# Patient Record
Sex: FEMALE | Race: WHITE | URBAN - METROPOLITAN AREA
[De-identification: names, ages, dates, MRNs, and addresses within clinical notes are randomized per-mention and may not be internally consistent; named-entity substitution may affect disease eponyms.]

---

## 2017-08-24 ENCOUNTER — APPOINTMENT (OUTPATIENT)
Dept: GENERAL RADIOLOGY | Facility: CLINIC | Age: 72
End: 2017-08-24
Attending: EMERGENCY MEDICINE
Payer: MEDICARE

## 2017-08-24 ENCOUNTER — APPOINTMENT (OUTPATIENT)
Dept: MRI IMAGING | Facility: CLINIC | Age: 72
End: 2017-08-24
Attending: EMERGENCY MEDICINE
Payer: MEDICARE

## 2017-08-24 ENCOUNTER — HOSPITAL ENCOUNTER (OUTPATIENT)
Facility: CLINIC | Age: 72
Setting detail: OBSERVATION
Discharge: HOME | End: 2017-08-25
Attending: EMERGENCY MEDICINE | Admitting: INTERNAL MEDICINE
Payer: MEDICARE

## 2017-08-24 DIAGNOSIS — H53.2 DIPLOPIA: ICD-10-CM

## 2017-08-24 DIAGNOSIS — G45.9 TRANSIENT CEREBRAL ISCHEMIA, UNSPECIFIED TYPE: Primary | ICD-10-CM

## 2017-08-24 LAB
ANION GAP SERPL CALCULATED.3IONS-SCNC: 7 MMOL/L (ref 3–14)
BASOPHILS # BLD AUTO: 0.1 10E9/L (ref 0–0.2)
BASOPHILS NFR BLD AUTO: 0.6 %
BUN SERPL-MCNC: 11 MG/DL (ref 7–30)
CALCIUM SERPL-MCNC: 9.2 MG/DL (ref 8.5–10.1)
CHLORIDE SERPL-SCNC: 104 MMOL/L (ref 94–109)
CO2 SERPL-SCNC: 31 MMOL/L (ref 20–32)
CREAT SERPL-MCNC: 0.84 MG/DL (ref 0.52–1.04)
DIFFERENTIAL METHOD BLD: ABNORMAL
EOSINOPHIL # BLD AUTO: 0.4 10E9/L (ref 0–0.7)
EOSINOPHIL NFR BLD AUTO: 4 %
ERYTHROCYTE [DISTWIDTH] IN BLOOD BY AUTOMATED COUNT: 13.5 % (ref 10–15)
GFR SERPL CREATININE-BSD FRML MDRD: 66 ML/MIN/1.7M2
GLUCOSE SERPL-MCNC: 86 MG/DL (ref 70–99)
HCT VFR BLD AUTO: 46.2 % (ref 35–47)
HGB BLD-MCNC: 15.4 G/DL (ref 11.7–15.7)
IMM GRANULOCYTES # BLD: 0 10E9/L (ref 0–0.4)
IMM GRANULOCYTES NFR BLD: 0.3 %
LYMPHOCYTES # BLD AUTO: 2.5 10E9/L (ref 0.8–5.3)
LYMPHOCYTES NFR BLD AUTO: 25.7 %
MCH RBC QN AUTO: 30.2 PG (ref 26.5–33)
MCHC RBC AUTO-ENTMCNC: 33.3 G/DL (ref 31.5–36.5)
MCV RBC AUTO: 91 FL (ref 78–100)
MONOCYTES # BLD AUTO: 0.7 10E9/L (ref 0–1.3)
MONOCYTES NFR BLD AUTO: 6.7 %
NEUTROPHILS # BLD AUTO: 6.2 10E9/L (ref 1.6–8.3)
NEUTROPHILS NFR BLD AUTO: 62.7 %
NRBC # BLD AUTO: 0 10*3/UL
NRBC BLD AUTO-RTO: 0 /100
PLATELET # BLD AUTO: 599 10E9/L (ref 150–450)
POTASSIUM SERPL-SCNC: 3.4 MMOL/L (ref 3.4–5.3)
RBC # BLD AUTO: 5.1 10E12/L (ref 3.8–5.2)
SODIUM SERPL-SCNC: 142 MMOL/L (ref 133–144)
TROPONIN I SERPL-MCNC: 0.06 UG/L (ref 0–0.04)
WBC # BLD AUTO: 9.8 10E9/L (ref 4–11)

## 2017-08-24 PROCEDURE — 96360 HYDRATION IV INFUSION INIT: CPT

## 2017-08-24 PROCEDURE — 71020 XR CHEST 2 VW: CPT

## 2017-08-24 PROCEDURE — 85025 COMPLETE CBC W/AUTO DIFF WBC: CPT | Performed by: EMERGENCY MEDICINE

## 2017-08-24 PROCEDURE — A9585 GADOBUTROL INJECTION: HCPCS | Performed by: RADIOLOGY

## 2017-08-24 PROCEDURE — 80048 BASIC METABOLIC PNL TOTAL CA: CPT | Performed by: EMERGENCY MEDICINE

## 2017-08-24 PROCEDURE — 99285 EMERGENCY DEPT VISIT HI MDM: CPT | Mod: 25

## 2017-08-24 PROCEDURE — G0378 HOSPITAL OBSERVATION PER HR: HCPCS

## 2017-08-24 PROCEDURE — 93005 ELECTROCARDIOGRAM TRACING: CPT

## 2017-08-24 PROCEDURE — 70549 MR ANGIOGRAPH NECK W/O&W/DYE: CPT

## 2017-08-24 PROCEDURE — 25000132 ZZH RX MED GY IP 250 OP 250 PS 637: Performed by: EMERGENCY MEDICINE

## 2017-08-24 PROCEDURE — 70553 MRI BRAIN STEM W/O & W/DYE: CPT

## 2017-08-24 PROCEDURE — 99207 ZZC CDG-MDM COMPONENT: MEETS MODERATE - UP CODED: CPT | Performed by: PHYSICIAN ASSISTANT

## 2017-08-24 PROCEDURE — 25000128 H RX IP 250 OP 636: Performed by: RADIOLOGY

## 2017-08-24 PROCEDURE — 96361 HYDRATE IV INFUSION ADD-ON: CPT

## 2017-08-24 PROCEDURE — 25000128 H RX IP 250 OP 636: Performed by: EMERGENCY MEDICINE

## 2017-08-24 PROCEDURE — 84484 ASSAY OF TROPONIN QUANT: CPT | Performed by: EMERGENCY MEDICINE

## 2017-08-24 PROCEDURE — 99220 ZZC INITIAL OBSERVATION CARE,LEVL III: CPT | Performed by: PHYSICIAN ASSISTANT

## 2017-08-24 RX ORDER — ONDANSETRON 4 MG/1
4 TABLET, ORALLY DISINTEGRATING ORAL EVERY 6 HOURS PRN
Status: DISCONTINUED | OUTPATIENT
Start: 2017-08-24 | End: 2017-08-25 | Stop reason: HOSPADM

## 2017-08-24 RX ORDER — PROCHLORPERAZINE 25 MG
12.5 SUPPOSITORY, RECTAL RECTAL EVERY 12 HOURS PRN
Status: DISCONTINUED | OUTPATIENT
Start: 2017-08-24 | End: 2017-08-25 | Stop reason: HOSPADM

## 2017-08-24 RX ORDER — SODIUM CHLORIDE 9 MG/ML
1000 INJECTION, SOLUTION INTRAVENOUS CONTINUOUS
Status: DISCONTINUED | OUTPATIENT
Start: 2017-08-24 | End: 2017-08-24

## 2017-08-24 RX ORDER — ACETAMINOPHEN 500 MG
500-1000 TABLET ORAL EVERY 6 HOURS PRN
Status: DISCONTINUED | OUTPATIENT
Start: 2017-08-24 | End: 2017-08-25 | Stop reason: HOSPADM

## 2017-08-24 RX ORDER — AMOXICILLIN 250 MG
1-2 CAPSULE ORAL 2 TIMES DAILY PRN
Status: DISCONTINUED | OUTPATIENT
Start: 2017-08-24 | End: 2017-08-25 | Stop reason: HOSPADM

## 2017-08-24 RX ORDER — GADOBUTROL 604.72 MG/ML
10 INJECTION INTRAVENOUS ONCE
Status: COMPLETED | OUTPATIENT
Start: 2017-08-24 | End: 2017-08-24

## 2017-08-24 RX ORDER — IBUPROFEN 600 MG/1
600 TABLET, FILM COATED ORAL EVERY 6 HOURS PRN
Status: DISCONTINUED | OUTPATIENT
Start: 2017-08-24 | End: 2017-08-25 | Stop reason: HOSPADM

## 2017-08-24 RX ORDER — ASPIRIN 325 MG
325 TABLET ORAL ONCE
Status: COMPLETED | OUTPATIENT
Start: 2017-08-24 | End: 2017-08-24

## 2017-08-24 RX ORDER — ONDANSETRON 2 MG/ML
4 INJECTION INTRAMUSCULAR; INTRAVENOUS EVERY 6 HOURS PRN
Status: DISCONTINUED | OUTPATIENT
Start: 2017-08-24 | End: 2017-08-25 | Stop reason: HOSPADM

## 2017-08-24 RX ORDER — ASPIRIN 81 MG/1
81 TABLET ORAL DAILY
Status: DISCONTINUED | OUTPATIENT
Start: 2017-08-25 | End: 2017-08-25 | Stop reason: HOSPADM

## 2017-08-24 RX ORDER — NALOXONE HYDROCHLORIDE 0.4 MG/ML
.1-.4 INJECTION, SOLUTION INTRAMUSCULAR; INTRAVENOUS; SUBCUTANEOUS
Status: DISCONTINUED | OUTPATIENT
Start: 2017-08-24 | End: 2017-08-25 | Stop reason: HOSPADM

## 2017-08-24 RX ORDER — PROCHLORPERAZINE MALEATE 5 MG
5 TABLET ORAL EVERY 6 HOURS PRN
Status: DISCONTINUED | OUTPATIENT
Start: 2017-08-24 | End: 2017-08-25 | Stop reason: HOSPADM

## 2017-08-24 RX ORDER — HYDRALAZINE HYDROCHLORIDE 20 MG/ML
10 INJECTION INTRAMUSCULAR; INTRAVENOUS EVERY 4 HOURS PRN
Status: DISCONTINUED | OUTPATIENT
Start: 2017-08-24 | End: 2017-08-25 | Stop reason: HOSPADM

## 2017-08-24 RX ADMIN — ASPIRIN 325 MG ORAL TABLET 325 MG: 325 PILL ORAL at 18:41

## 2017-08-24 RX ADMIN — GADOBUTROL 10 ML: 604.72 INJECTION INTRAVENOUS at 18:36

## 2017-08-24 RX ADMIN — SODIUM CHLORIDE 1000 ML: 9 INJECTION, SOLUTION INTRAVENOUS at 17:30

## 2017-08-24 ASSESSMENT — VISUAL ACUITY
OS: 20/25
OD: 20/25

## 2017-08-24 ASSESSMENT — ENCOUNTER SYMPTOMS
ABDOMINAL PAIN: 0
HEMATURIA: 0
SHORTNESS OF BREATH: 0
FREQUENCY: 0
SPEECH DIFFICULTY: 0
DIARRHEA: 1
DIFFICULTY URINATING: 0
NUMBNESS: 0
HEADACHES: 1
DIZZINESS: 1
PHOTOPHOBIA: 1
WEAKNESS: 0
DYSURIA: 0

## 2017-08-24 ASSESSMENT — PAIN DESCRIPTION - DESCRIPTORS: DESCRIPTORS: HEADACHE

## 2017-08-24 NOTE — ED NOTES
"Patient presents complaining of headache and \"a spell\" of double vision that lasted about 5 minutes this afternoon. She denies other complaints at this time. She is alert and oriented, ABCs intact.  "

## 2017-08-24 NOTE — IP AVS SNAPSHOT
MRN:[id number]                      After Visit Summary   8/24/2017    Ally Hernandez    MRN: [id number]           Thank you!     Thank you for choosing [facility] for your care. Our goal is always to provide you with excellent care. Hearing back from our patients is one way we can continue to improve our services. Please take a few minutes to complete the written survey that you may receive in the mail after you visit. If you would like to speak to someone directly about your visit please contact Patient Relations at [phone]. Thank you!          Patient Information     Date Of Birth          1945        About your hospital stay     You were admitted on:  August 24, 2017 You last received care in the:  [facility] Observation Department    You were discharged on:  August 25, 2017        Reason for your hospital stay       You were evaluated in the hospital for a several-minute episode of diplopia (double vision) and increased headache. Your vision changes resolved before you got to the ER. You were evaluated extensively in the ER and had a normal MRI of the brain and neck vessels. A lab called troponin was checked and found to be mildly elevated. Your heart rhythm was monitored overnight, and we checked additional troponin values throughout the night to trend them. Your troponin values were rather stable, slowly trended up a little and then back down. We discussed that elevated troponin lab can be from multiple causes (anything that would put strain on the heart like persistently high blood pressure, rapid heart rhythm, or in some cases a heart attack). You heart rhythm was a normal rate and regular rhythm all night. You had no chest pain or shortness of breath. You did have very high blood pressure when you came to the emergency department (now resolved) and on a previous cardiac monitor you had several small episodes of rapid heart rhythms), so that may be why  the troponin values were mildly elevated. An echocardiogram was performed that was normal with no evidence of patent foramen ovale (opening between the top two chambers of the heart). It is unclear what caused your symptoms. It may be related to post-concussive syndrome, it may have been a transient ischemic attack (stroke-like symptoms that resolve in less than 24 hours), or it may have been related to an atypical migraine headache. For now, we will have you restart on aspirin 81 mg daily (along with zantac 150 mg twice a day to protect your stomach lining).  We will have you wear a zio patch for the next two weeks. Continue concussion protocol as instructed by your primary providers. We will have you follow up with your primary care provider within the next week.                  Who to Call     For medical emergencies, please call 911.  For non-urgent questions about your medical care, please call your primary care provider or clinic, [phone]          Attending Provider     Provider Specialty    Bruna Hubbard MD Emergency Medicine    Erick Mendez MD Emergency Medicine    Michelle Turner DO Internal Medicine       Primary Care Provider Office Phone # Fax #    Loida Cox [phone]       When to contact your care team       Call your primary doctor if you have any of the following: temperature greater than 101, increased shortness of breath or increased pain.  Return to the ER if you have chest pain, shortness of breath, or anything concerning for stroke (vision loss or sudden change, facial droop, difficulty with speech, weakness or numbness on one side of the body).                  After Care Instructions     Activity       Your activity upon discharge: activity as tolerated            Diet       Follow this diet upon discharge: Orders Placed This Encounter      Regular Diet Adult                  Follow-up Appointments     Follow-up and recommended labs and  "tests        Follow up with primary care provider, Loida Cox, within 7 days for hospital follow- up.  No follow up labs or test are needed at this time, follow up with Loida Cox regarding your Zio Patch results.                             Pending Results     No orders found for last 3 day(s).            Statement of Approval     Ordered          17 1237  I have reviewed and agree with all the recommendations and orders detailed in this document.  EFFECTIVE NOW     Approved and electronically signed by:  Lashae Conrad PA-C             Admission Information     Date & Time Provider Department Dept. Phone    2017 Michelle Turner DO [facility] Observation Department [phone]      Your Vitals Were     Blood Pressure Pulse Temperature Respirations Height Weight    121/72 (BP Location: Left arm) 68 97.5  F (36.4  C) (Oral) 18 1.702 m (5' 7\") 62.5 kg (137 lb 12.8 oz)    Pulse Oximetry BMI (Body Mass Index)                96% 21.58 kg/m2          [employer] Information     [employer] lets you send messages to your doctor, view your test results, renew your prescriptions, schedule appointments and more. To sign up, go to [email or web address] . Click on \"Log in\" on the left side of the screen, which will take you to the Welcome page. Then click on \"Sign up Now\" on the right side of the page.     You will be asked to enter the access code listed below, as well as some personal information. Please follow the directions to create your username and password.     Your access code is: [email or web address]  Expires: 2017 12:38 PM     Your access code will  in 90 days. If you need help or a new code, please call your [place] clinic or [phone].        Care EveryWhere ID     This is your Care EveryWhere ID. This could be used by other organizations to access your [place] medical records  [id number]        Equal Access to Services     VENKAT AMES AH: Cristiane ambrosio " Leoncio, wadominickda luqadaha, qaybta kaalmada carlos, michi livingstonnancy bobby. [facility] [phone].    ATENCIÓN: Si nelly boyle, tiene a martinez disposición servicios gratuitos de asistencia lingüística. Tian al [phone].    We comply with applicable federal civil rights laws and [place] laws. We do not discriminate on the basis of race, color, national origin, age, disability sex, sexual orientation or gender identity.               Review of your medicines      START taking        Dose / Directions    aspirin 81 MG EC tablet   Used for:  Transient cerebral ischemia, unspecified type        Dose:  81 mg   Take 1 tablet (81 mg) by mouth daily   Refills:  0       ranitidine 75 MG tablet   Commonly known as:  ZANTAC   Used for:  Transient cerebral ischemia, unspecified type        Dose:  75 mg   Take 1 tablet (75 mg) by mouth 2 times daily   Quantity:  30 tablet   Refills:  0         CONTINUE these medicines which have NOT CHANGED        Dose / Directions    ATORVASTATIN CALCIUM PO        Dose:  40 mg   Take 40 mg by mouth At Bedtime   Refills:  0       LISINOPRIL PO        Dose:  10 mg   Take 10 mg by mouth daily   Refills:  0            Where to get your medicines      Some of these will need a paper prescription and others can be bought over the counter. Ask your nurse if you have questions.     You don't need a prescription for these medications     aspirin 81 MG EC tablet    ranitidine 75 MG tablet                Protect others around you: Learn how to safely use, store and throw away your medicines at www.disposemymeds.org.             Medication List: This is a list of all your medications and when to take them. Check marks below indicate your daily home schedule. Keep this list as a reference.      Medications           Morning Afternoon Evening Bedtime As Needed    aspirin 81 MG EC tablet   Take 1 tablet (81 mg) by mouth daily   Last time this was given:  81 mg on 8/25/2017  8:54 AM                                 ATORVASTATIN CALCIUM PO   Take 40 mg by mouth At Bedtime   Last time this was given:  40 mg on 8/25/2017  4:09 AM                                LISINOPRIL PO   Take 10 mg by mouth daily   Last time this was given:  10 mg on 8/25/2017  8:54 AM                                ranitidine 75 MG tablet   Commonly known as:  ZANTAC   Take 1 tablet (75 mg) by mouth 2 times daily

## 2017-08-24 NOTE — IP AVS SNAPSHOT
[facility] Observation Department    [address]    [facility] [address]    Phone:  [phone]                                       After Visit Summary   8/24/2017    Ally Hernandez    MRN: [id number]           After Visit Summary Signature Page     I have received my discharge instructions, and my questions have been answered. I have discussed any challenges I see with this plan with the nurse or doctor.    ..........................................................................................................................................  Patient/Patient Representative Signature      ..........................................................................................................................................  Patient Representative Print Name and Relationship to Patient    ..................................................               ................................................  Date                                            Time    ..........................................................................................................................................  Reviewed by Signature/Title    ...................................................              ..............................................  Date                                                            Time

## 2017-08-24 NOTE — ED PROVIDER NOTES
"  History     Chief Complaint:  Double vision, Headache    HPI   Ally Hernandez is a 71 year old female who presents with double vision and a headache. About six weeks ago, she fell and hit the back of her head and was diagnosed with a concussion. Since then, she has been going to physical therapy and a chiropractor to help with recovery. She notes intermittent headaches since the concussion. Tuesday evening, two days ago, she was preparing dinner when she experienced a bout of dizziness. She had to sit down for \"quite a while,\" which seemed to help. Today at 2:00 PM, she sat down at the computer to check something when she experienced double vision and worsening of her headache. She estimated it lasted about five minutes, and subsided after she kept her eyes closed. This has not happened before, but she became nervous about the double vision since it was on a list of things to watch for with her concussion. She reported that the headache felt more severe after the computer incident, but currently feels similar to the headaches she has experienced since the concussion. She notes mild photophobia with the headache. The patient has no personal history of strokes or TIA, but her mother has a history of TIA. She has no personal history of heart attacks, but she does have moderate hypertension, which she takes pills for, and had a pericardiectomy in 1987. She denies dyspnea, chest pain, abdominal pain, fever, vomiting, or urinary symptoms. No numbness, weakness, or speech changes. The patient notes she has had diarrhea today, but this is not abnormal for her.      Allergies:  Indocin     Medications:    Lisinopril    Atorvastatin       Past Medical History:    Hypertension  Concussion     Past Surgical History:    Pericardiectomy    Family History:    TIA    Social History:  Smoking status: No  Alcohol use: No  Marital Status:   [2]  Presents to ED with daughters     Review of Systems   Eyes: Positive for " photophobia and visual disturbance.   Respiratory: Negative for shortness of breath.    Cardiovascular: Negative for chest pain.   Gastrointestinal: Positive for diarrhea. Negative for abdominal pain.   Genitourinary: Negative for difficulty urinating, dysuria, frequency and hematuria.   Musculoskeletal: Negative for gait problem.   Neurological: Positive for dizziness and headaches. Negative for speech difficulty, weakness and numbness.   All other systems reviewed and are negative.      Physical Exam   Patient Vitals for the past 24 hrs:   BP Temp Temp src Pulse Heart Rate Resp SpO2   08/24/17 1905 (!) 162/99 - - - 77 17 98 %   08/24/17 1850 143/85 - - - 72 23 98 %   08/24/17 1715 (!) 163/99 - - - - - -   08/24/17 1640 156/90 - - - 71 - 99 %   08/24/17 1635 (!) 175/93 - - - 88 - 99 %   08/24/17 1605 (!) 186/109 97.5  F (36.4  C) Oral 82 - 20 95 %     Physical Exam  General: Cooperative, alert. Appears in mild discomfort.  Head:  The scalp, face, and head appear normal  Eyes:  The pupils are equal, round, and reactive to light    There is no nystagmus    Extraocular muscles are intact. Unable to replicate diplopia with extraocular movements.    Conjunctivae and sclerae are normal  ENT:    The nose is normal    Pinnae are normal    The oropharynx is normal    Uvula is in the midline  Neck:  Normal range of motion  CV:  Regular rate and underlying rhythm     There is no evidence of Atrial Fibrillation    Normal S1 and S2    No S3 or S4    No pathological murmur detected  Resp:  Lungs are clear    There is no tachypnea    Non-labored breathing    No rales    No wheezing   GI:  Abdomen is soft, there is no rigidity    No distension    No tympani    No rebound tenderness     Non-surgical without peritoneal features  MS:  No major joint effusions    No asymmetric leg swelling    No calf tenderness  Skin:  No rash or acute skin lesions noted  Neuro: NIHSS:    LOC:  Alert      Answers questions correctly      Obeys  commands correctly    Gaze:  Normal. No palsy or forced deviation    Visual:  No visual loss, no hemianopsia    Facial:  Normal.  No palsy    Motor:  No drift, weakness, all four extremities tested    Ataxia:  No limb ataxia    Sensory: Normal    Speech: No aphasia      No dysarthria    Inattention: No neglect    Total:  0  Psych:  Awake. Alert.  Normal affect.  Appropriate interactions.  Lymph: No anterior or posterior cervical lymphadenopathy noted      Emergency Department Course   ECG:  ECG taken at 1619, ECG read at 2015  Normal sinus rhythm   Possible left atrial enlargement   Abnormal ECG  Rate 78 bpm. ME interval 148. QRS duration 74. QT/QTc 374/426. P-R-T axes 69 -27 33.    Imaging:  Radiology findings were communicated with the patient who voiced understanding of the findings.    MRA Angiogram Neck w/o and w contrast  Normal MR angiogram of the neck.  As read by Radiology.    MR Brain w/o and w contrast  Diffuse cerebral volume loss and cerebral white matter  changes consistent with chronic small vessel ischemic disease. No  evidence for acute intracranial pathology.   As read by Radiology.    X-ray Chest, 2 views:  Clear lungs  Result per radiology.     Laboratory:  Troponin: 0.061 (H)  CBC: (H), o/w  WNL (WBC 9.8, HGB 15.4)   BMP: WNL (Creatinine 0.84)    Interventions:  1730: NS 1L IV Bolus  1841: Aspirin 325 mg oral     Emergency Department Course:  Past medical records, nursing notes, and vitals reviewed.  1710: I performed an exam of the patient and obtained history, as documented above.  IV inserted and blood drawn. The patient was placed on continuous cardiac monitoring and pulse oximetry.  ECG obtained, results above  The patient was sent for a chest x-ray while in the emergency department, findings above.  The patient was sent for a MRI/MRA head neck while in the emergency department, findings above.    ABCD2 Score for TIA (calculator)  Background  Calculates overall risk of future TIA  based on 5 factors: Age>=60, SBP>140/90, weakness, impaired speech, duration, diabetes.  Data  71 year old   does not have a problem list on file.  Criteria   Of possible 7 points for 6 possible items  1 point: Age >=60  1 point: Systolic Blood pressure>=140/90  Interpretation  ABCD Score: 2  Points 0-3: Risk of CVA 1.0% within the subsequent 2 days of TIA    1903: I rechecked the patient. Explained findings to the patient and daughters.    1941: I spoke to PAULINE Saini of the hospitalist service who accepts the patient for admission.     Findings and plan explained to the Patient who consents to admission.   Discussed the patient with PAULINE Saini, who will admit the patient to an obs bed for further monitoring, evaluation, and treatment.     Impression & Plan      Medical Decision Making:  Patient is a 71 year old female with a history of hypertension and concussion who presents with a brief episode of diplopia lasting for about 5 minutes early this afternoon. The patient may have had a TIA, which was our largest concern on her initial evaluation. She has a normal neuro exam here with no focal deficits elicited by my exam. Her blood pressure remains in the 160s systolic while in the emergency department. She has no return of her symptoms while in the emergency department. MRI was negative. On the rest of her work-up, her labs are remarkable for an elevated troponin to 0.061. Her EKG is non-ischemic and the patient did not have any evidence of chest pain or anginal symptoms. Due to elevated troponin and likely TIA versus atypical migraine experience today, the patient will be admitted for observation for serial troponins, and further evaluation and risk stratification for TIA.      Diagnosis:  1. Transient cerebral ischemia, unspecified type  2. Diplopia     Disposition:  Admitted to observation     8/24/2017   [facility] EMERGENCY DEPARTMENT    Riya FLORES, am serving as a scribe at 5:10  PM on 8/24/2017 to document services personally performed by Erick Mendez MD based on my observations and the provider's statements to me.          Erick Mendez MD  08/25/17 0041

## 2017-08-25 ENCOUNTER — APPOINTMENT (OUTPATIENT)
Dept: CARDIOLOGY | Facility: CLINIC | Age: 72
End: 2017-08-25
Attending: PHYSICIAN ASSISTANT
Payer: MEDICARE

## 2017-08-25 VITALS
HEART RATE: 60 BPM | DIASTOLIC BLOOD PRESSURE: 81 MMHG | HEIGHT: 67 IN | RESPIRATION RATE: 20 BRPM | WEIGHT: 137.8 LBS | TEMPERATURE: 97.9 F | OXYGEN SATURATION: 98 % | BODY MASS INDEX: 21.63 KG/M2 | SYSTOLIC BLOOD PRESSURE: 150 MMHG

## 2017-08-25 LAB
ANION GAP SERPL CALCULATED.3IONS-SCNC: 8 MMOL/L (ref 3–14)
BASOPHILS # BLD AUTO: 0.1 10E9/L (ref 0–0.2)
BASOPHILS NFR BLD AUTO: 1.2 %
BUN SERPL-MCNC: 12 MG/DL (ref 7–30)
CALCIUM SERPL-MCNC: 8.4 MG/DL (ref 8.5–10.1)
CHLORIDE SERPL-SCNC: 110 MMOL/L (ref 94–109)
CHOLEST SERPL-MCNC: 129 MG/DL
CO2 SERPL-SCNC: 24 MMOL/L (ref 20–32)
CREAT SERPL-MCNC: 0.68 MG/DL (ref 0.52–1.04)
DIFFERENTIAL METHOD BLD: NORMAL
EOSINOPHIL # BLD AUTO: 0.4 10E9/L (ref 0–0.7)
EOSINOPHIL NFR BLD AUTO: 6.8 %
ERYTHROCYTE [DISTWIDTH] IN BLOOD BY AUTOMATED COUNT: 13.6 % (ref 10–15)
GFR SERPL CREATININE-BSD FRML MDRD: 84 ML/MIN/1.7M2
GLUCOSE SERPL-MCNC: 83 MG/DL (ref 70–99)
HCT VFR BLD AUTO: 40.6 % (ref 35–47)
HDLC SERPL-MCNC: 48 MG/DL
HGB BLD-MCNC: 13.7 G/DL (ref 11.7–15.7)
IMM GRANULOCYTES # BLD: 0 10E9/L (ref 0–0.4)
IMM GRANULOCYTES NFR BLD: 0.3 %
INTERPRETATION ECG - MUSE: NORMAL
LDLC SERPL CALC-MCNC: 54 MG/DL
LYMPHOCYTES # BLD AUTO: 2 10E9/L (ref 0.8–5.3)
LYMPHOCYTES NFR BLD AUTO: 31.5 %
MCH RBC QN AUTO: 30.6 PG (ref 26.5–33)
MCHC RBC AUTO-ENTMCNC: 33.7 G/DL (ref 31.5–36.5)
MCV RBC AUTO: 91 FL (ref 78–100)
MONOCYTES # BLD AUTO: 0.5 10E9/L (ref 0–1.3)
MONOCYTES NFR BLD AUTO: 8.4 %
NEUTROPHILS # BLD AUTO: 3.3 10E9/L (ref 1.6–8.3)
NEUTROPHILS NFR BLD AUTO: 51.8 %
NONHDLC SERPL-MCNC: 81 MG/DL
NRBC # BLD AUTO: 0 10*3/UL
NRBC BLD AUTO-RTO: 0 /100
PLATELET # BLD AUTO: 434 10E9/L (ref 150–450)
POTASSIUM SERPL-SCNC: 3.7 MMOL/L (ref 3.4–5.3)
RBC # BLD AUTO: 4.48 10E12/L (ref 3.8–5.2)
SODIUM SERPL-SCNC: 142 MMOL/L (ref 133–144)
TRIGL SERPL-MCNC: 135 MG/DL
TROPONIN I SERPL-MCNC: 0.07 UG/L (ref 0–0.04)
WBC # BLD AUTO: 6.5 10E9/L (ref 4–11)

## 2017-08-25 PROCEDURE — 80061 LIPID PANEL: CPT | Performed by: PHYSICIAN ASSISTANT

## 2017-08-25 PROCEDURE — 0296T ZIO PATCH HOLTER: CPT | Performed by: PHYSICIAN ASSISTANT

## 2017-08-25 PROCEDURE — 84484 ASSAY OF TROPONIN QUANT: CPT | Mod: 91 | Performed by: PHYSICIAN ASSISTANT

## 2017-08-25 PROCEDURE — 40000264 ECHO COMPLETE BUBBLE

## 2017-08-25 PROCEDURE — 99217 ZZC OBSERVATION CARE DISCHARGE: CPT | Performed by: PHYSICIAN ASSISTANT

## 2017-08-25 PROCEDURE — 36415 COLL VENOUS BLD VENIPUNCTURE: CPT | Performed by: PHYSICIAN ASSISTANT

## 2017-08-25 PROCEDURE — 25000132 ZZH RX MED GY IP 250 OP 250 PS 637: Mod: GY | Performed by: PHYSICIAN ASSISTANT

## 2017-08-25 PROCEDURE — A9270 NON-COVERED ITEM OR SERVICE: HCPCS | Mod: GY | Performed by: PHYSICIAN ASSISTANT

## 2017-08-25 PROCEDURE — 85025 COMPLETE CBC W/AUTO DIFF WBC: CPT | Performed by: PHYSICIAN ASSISTANT

## 2017-08-25 PROCEDURE — G0378 HOSPITAL OBSERVATION PER HR: HCPCS

## 2017-08-25 PROCEDURE — 93306 TTE W/DOPPLER COMPLETE: CPT

## 2017-08-25 PROCEDURE — 80048 BASIC METABOLIC PNL TOTAL CA: CPT | Performed by: PHYSICIAN ASSISTANT

## 2017-08-25 PROCEDURE — 84484 ASSAY OF TROPONIN QUANT: CPT | Performed by: PHYSICIAN ASSISTANT

## 2017-08-25 PROCEDURE — 0298T ZZC EXT ECG > 48HR TO 21 DAY REVIEW AND INTERPRETATN: CPT | Performed by: INTERNAL MEDICINE

## 2017-08-25 PROCEDURE — 93306 TTE W/DOPPLER COMPLETE: CPT | Mod: 26 | Performed by: INTERNAL MEDICINE

## 2017-08-25 RX ORDER — LISINOPRIL 10 MG/1
10 TABLET ORAL DAILY
Status: DISCONTINUED | OUTPATIENT
Start: 2017-08-25 | End: 2017-08-25 | Stop reason: HOSPADM

## 2017-08-25 RX ORDER — MAGNESIUM HYDROXIDE 1200 MG/15ML
30 LIQUID ORAL ONCE
Status: DISCONTINUED | OUTPATIENT
Start: 2017-08-25 | End: 2017-08-25 | Stop reason: HOSPADM

## 2017-08-25 RX ORDER — ATORVASTATIN CALCIUM 40 MG/1
40 TABLET, FILM COATED ORAL AT BEDTIME
Status: DISCONTINUED | OUTPATIENT
Start: 2017-08-25 | End: 2017-08-25 | Stop reason: HOSPADM

## 2017-08-25 RX ADMIN — ACETAMINOPHEN 500 MG: 500 TABLET, FILM COATED ORAL at 00:04

## 2017-08-25 RX ADMIN — LISINOPRIL 10 MG: 10 TABLET ORAL at 08:54

## 2017-08-25 RX ADMIN — ATORVASTATIN CALCIUM 40 MG: 40 TABLET, FILM COATED ORAL at 04:09

## 2017-08-25 RX ADMIN — ASPIRIN 81 MG: 81 TABLET, COATED ORAL at 08:54

## 2017-08-25 ASSESSMENT — PAIN DESCRIPTION - DESCRIPTORS: DESCRIPTORS: HEADACHE

## 2017-08-25 NOTE — PLAN OF CARE
Problem: Discharge Planning  Goal: Discharge Planning (Adult, OB, Behavioral, Peds)  Outcome: Improving  PRIMARY DIAGNOSIS: DIPLOPIA/TIA R/O  OUTPATIENT/OBSERVATION GOALS TO BE MET BEFORE DISCHARGE:  1. Orthostatic performed: N/A      2. Diagnostic testing complete & at baseline neurologic testing: echo done-result pending       3. Cleared by consultants (if involved): N/A      4. Interpretation of cardiac rhythm per telemetry tech:  SR hr 82       5. Tolerating adequate PO diet and medications: Yes      6. Return to near baseline physical activity or neurologic status: Yes      Echo done-results pending. Patient denies headache- neuro intact. No double vision. Moving ind. Waiting for echo results.       Discharge Planner Nurse   Safe discharge environment identified: Yes  Barriers to discharge: Yes - echo       Entered by: Kadi Shay 0800       Please review provider order for any additional goals.   Nurse to notify provider when observation goals have been met and patient is ready for discharge.

## 2017-08-25 NOTE — PROGRESS NOTES
ROOM # 208-2    Living Situation (if not independent, order SW consult):House  Facility name:  : [facility]    Activity level at baseline: IND     Activity level on admit: IND      Patient registered to observation; given Patient Bill of Rights; given the opportunity to ask questions about observation status and their plan of care.  Patient has been oriented to the observation room, bathroom and call light is in place.    Discussed discharge goals and expectations with patient/family.

## 2017-08-25 NOTE — ED NOTES
[facility]  ED Nurse Handoff Report    Ally Hernandez is a 71 year old female   ED Chief complaint: Eye Problem (double vision) and Headache  . ED Diagnosis:   Final diagnoses:   None     Allergies:   Allergies   Allergen Reactions     Indocin [Indomethacin] GI Disturbance       Code Status: Full Code  Activity level - Baseline/Home:  Independent. Activity Level - Current:   Independent. Lift room needed: No. Bariatric: No   Needed: No   Isolation: No. Infection: Not Applicable.     Vital Signs:   Vitals:    08/24/17 1730 08/24/17 1745 08/24/17 1850 08/24/17 1905   BP: (!) 150/108  143/85 (!) 162/99   Pulse:       Resp:  17 23 17   Temp:       TempSrc:       SpO2:  99% 98% 98%       Cardiac Rhythm:  ,      Pain level: 0-10 Pain Scale: 5  Patient confused: No. Patient Falls Risk: Yes.   Elimination Status: Has voided   Patient Report - Initial Complaint: Pt presents with a headache that has been ongoing for the past week and a 5 minute episode of double vision that occurred today. Focused Assessment: Pt presents with a headache with associated 5 minute episode of double vision that occurred while pt was sitting at her computer. Pt states her headache is generalized and the double vision has gone away. Pt was dx with a concussion 6 weeks ago after suffering a head injury. Pt denies any weakness, numbness or tingling in any of her extremities. Of note, pt's troponin was found to be positive, pt denies any chest pain, SOB, n/v/d  Tests Performed: x-ray, MRI, labs. Abnormal Results:   Labs Ordered and Resulted from Time of ED Arrival Up to the Time of Departure from the ED   CBC WITH PLATELETS DIFFERENTIAL - Abnormal; Notable for the following:        Result Value    Platelet Count 599 (*)     All other components within normal limits   TROPONIN I - Abnormal; Notable for the following:     Troponin I ES 0.061 (*)     All other components within normal limits   BASIC METABOLIC PANEL   VISION  CHECKS   ORTHOSTATIC BLOOD PRESSURE AND PULSE   CARDIAC CONTINUOUS MONITORING       Treatments provided: 1L NS, 325 mg ASA  Family Comments: Daughter at bedside  OBS brochure/video discussed/provided to patient:  Yes  ED Medications:   Medications   0.9% sodium chloride BOLUS (0 mLs Intravenous Stopped 8/24/17 1920)     Followed by   0.9% sodium chloride infusion (not administered)   gadobutrol (GADAVIST) injection 10 mL (10 mLs Intravenous Given 8/24/17 1836)   sodium chloride (PF) 0.9% PF flush 60 mL (60 mLs Intravenous Given 8/24/17 1836)   aspirin tablet 325 mg (325 mg Oral Given 8/24/17 1841)     Drips infusing:  No  For the majority of the shift this patient was Green. Interventions performed were    Severe Sepsis OR Septic Shock Diagnosis Present: No      ED Nurse Name/Phone Number: Jesikamichelle Granados,   7:20 PM    RECEIVING UNIT ED HANDOFF REVIEW    Above ED Nurse Handoff Report was reviewed: Yes  Reviewed by: Simeon Thao on August 24, 2017 at 8:35 PM

## 2017-08-25 NOTE — PLAN OF CARE
Problem: Discharge Planning  Goal: Discharge Planning (Adult, OB, Behavioral, Peds)  Outcome: Adequate for Discharge Date Met:  08/25/17  Patient's After Visit Summary was reviewed with patient and daughter  Patient verbalized understanding of After Visit Summary, recommended follow up and was given an opportunity to ask questions.   Discharge medications sent home with patient/family: Not applicable   Discharged with daughter     OBSERVATION patient END time: 1304

## 2017-08-25 NOTE — PLAN OF CARE
Problem: Discharge Planning  Goal: Discharge Planning (Adult, OB, Behavioral, Peds)  PRIMARY DIAGNOSIS: DIPLOPIA/TIA R/O  OUTPATIENT/OBSERVATION GOALS TO BE MET BEFORE DISCHARGE:  1. Orthostatic performed: N/A     2. Diagnostic testing complete & at baseline neurologic testing: No, echo with bubble in AM     3. Cleared by consultants (if involved): N/A     4. Interpretation of cardiac rhythm per telemetry tech:      5. Tolerating adequate PO diet and medications: Yes     6. Return to near baseline physical activity or neurologic status: Yes     Pt A&Ox4, vss. Pt reports vision is WNL, no double vision. Pt c/o 3-4/10 headache, PRN tylenol given. Pt up with SBA to manage IV pole. IVF infusing. Trop 0.061, second drawn and in process, 3rd to be drawn at 0600. Will continue to monitor.     Discharge Planner Nurse   Safe discharge environment identified: Yes  Barriers to discharge: Yes - echo       Entered by: Bina Alberto 08/25/2017 12:11 AM     Please review provider order for any additional goals.   Nurse to notify provider when observation goals have been met and patient is ready for discharge.

## 2017-08-25 NOTE — PHARMACY-ADMISSION MEDICATION HISTORY
Admission medication history interview status for this patient is complete. See [facility] admission navigator for allergy information, prior to admission medications and immunization status.     Medication history interview source(s):Patient  Medication history resources (including written lists, pill bottles, clinic record):epic    Changes made to PTA medication list:  Added: none  Deleted: none  Changed: times    Medication reconciliation/reorder completed by provider prior to medication history? No    For patients on insulin therapy: no (Y/N)  Lantus/levemir/NPH/Mix 70/30 dose:   (Y/N) (see Med list for doses)   Sliding scale Novolog Y/N  If Yes, do you have a baseline novolog pre-meal dose:  units with meals  Patients eat three meals a day:   Y/N    How many episodes of hypoglycemia do you have per week: _______  How many missed doses do you have per week: ______  How many times do you check your blood glucose per day: _______   Any Barriers to therapy - Be specific :  cost of medications, comfortable with giving injections (if applicable), comfortable and confident with current diabetes regimen: Y/N ______________      Prior to Admission medications    Medication Sig Last Dose Taking? Auth Provider   LISINOPRIL PO Take 10 mg by mouth daily 8/24/2017 at am Yes Reported, Patient   ATORVASTATIN CALCIUM PO Take 40 mg by mouth At Bedtime  8/23/2017 at hs Yes Reported, Patient

## 2017-08-25 NOTE — PLAN OF CARE
Problem: Discharge Planning  Goal: Discharge Planning (Adult, OB, Behavioral, Peds)  PRIMARY DIAGNOSIS: DIPLOPIA/TIA R/O  OUTPATIENT/OBSERVATION GOALS TO BE MET BEFORE DISCHARGE:  1. Orthostatic performed: N/A      2. Diagnostic testing complete & at baseline neurologic testing: yes, echo result back       3. Cleared by consultants (if involved): N/A      4. Interpretation of cardiac rhythm per telemetry tech:  SR hr 82       5. Tolerating adequate PO diet and medications: Yes      6. Return to near baseline physical activity or neurologic status: Yes      Echo done-negative. Patient denies headache- neuro intact. No double vision. Moving ind. Waiting for echo results.  Will place zio patch and d/c shortly.       Discharge Planner Nurse   Safe discharge environment identified: Yes  Barriers to discharge: Yes - echo       Entered by: Kadi Shay 1200       Please review provider order for any additional goals.   Nurse to notify provider when observation goals have been met and patient is ready for discharge.

## 2017-08-25 NOTE — H&P
[facility] / Observ[facility] Unit  History and Physical Exam     Ally Hernandez MRN# [id number]   YOB: 1945 Age: 71 year old      Date of Admission: 8/24/2017    Primary care provider: Loida Cox   Ally Hernandez is a 71 year old female with a PMH significant for HTN, HLD, pericarditis s/p pericardiectomy, and h/o migraines with recent concussion 6 weeks ago who presented to the Emergency Room with complaints of diplopia and headache concerning for TIA vs atypical migraine.     Work up in the ED reveals: unremarkable BMP, troponin of 0.061, CBC unremarkable except for platelets of 599, MRI of brain negative for acute pathology, MRA of neck negative, CXR negative, and EKG shows rate of 78 bpm in NSR with possible atrial enlargement.     Patient will be registered to Observation for further work-up and evaluation.     1. Diplopia, headache: ongoing headaches for the last 6 weeks secondary to a concussion from hitting her head with episode of diplopia and numbness/tinglingin hands bilaterally. MRI of brain obtained in ED negative for acute CVA. Symptoms resolved besides headache after 5 minutes and headache improved after receiving ASA in the ED. Differential includes TIA versus atypical migraine. Patient has previously undergone monitoring with a Zio patch after a syncopal episode over new years with results showing episodes of SVT and one episode of Vtach but no episodes of A-fib or flutter. Will complete TIA workup with monitoring on telemetry, obtaining an echocardiogram, and continuing the patient on a 81 mg ASA. Higher suspicion for migraine due to h/o migraines. Will monitor overnight for any changes or recurrence of symptoms.     2. Mild troponin elevation: troponin of 0.061 but patient is asymptomatic. No prior h/o CAD. Will trend troponins and obtain echocardiogram as part of TIA workup to evaluate for any wall motion abnormalities. May be related to mild demand  ischemia.     3. HTN: intermittently hypertensive since arriving in the ED, may be related to headache. Continue Lisinopril. PRN IV Hydralazine for SBP >180.      4. HLD: most recent lipid panel in 07/2016 WNL. Continue Atorvastatin.          Plan     1. Registered to Observation  2. Continue telemetry monitoring   3. Start Aspirin EC 81 mg po daily  4. Neuro Checks Q4,  monitor BP, check fasting lipid panel   5. Obtain ECHO with bubble to rule out atrial clot and significant PFO    DVT prophylaxis: pt initiated on Aspirin EC 81 mg po daily , encourage ambulation   Code: Full, discussed with patient   Dispo: likely discharge within 24-48 hours                 Chief Complaint:   Diplopia and headache          History of Present Illness:   History obtained from discussion with ED provider, chart review, and interview with patient.     Ally Hernandez  is a 71 year old female who states that on 7/12/17 she was trying to lift a bag out of the back of her car when she fell backwards onto the blacktop and hip her head on the ground. Denies LOC. She did have immediate onset of pain. The following day the patient was seen in Urgent Care where a CT of the head was obtained and negative as well x-ray of cervical spine was done without signs of fracture or dislocation. The patient was diagnosed with a concussion at that time. Since then the patient has been have ongoing headaches that are located in the occipital region of her head and will shoot forward intermittently. Over the last week her headaches have been daily and intermittently radiates to her upper neck that she rates as a 3-4/10. The patient has only been taking OTC Tylenol for her pain. Patient states on Tuesday evening she had an episode of dizziness while cooking dinner where she needed to sit down to rest which improved her symptoms. Today around 2 PM the patient went to use the computer when she experienced double vision with acute worsening of her headaches  "along with associated numbness and tingling in her bilateral hands. The patient sat down and closed her eyes with reports of the double vision and numbness/tingling lasting about 5 minutes. The patient notes associated lightheadedness during this episode. After her vision improved her headache increased to 6/10. Her headache improved to 1/10 in the ED after receiving ASA. The patient has not had symptoms similar to this previously. The patient does report a h/o migraines when she was younger that had an associated aura involving \"blurry spots\" but she hasn't had any issues with this for many years except for yesterday. She states that yesterday she woke up with an aura. Denies changes in speech, hearing changes, weakness, F/C, N/V, abdominal pain, chest pain, or shortness of breath. Unsure of facial droop since the episode was unwitnessed and she did not look in a mirror. Denies h/o MI, DM, TIA , or CVA. Patient did have diarrhea today but this isn't unusual for her, she reports alternating between diarrhea and constipation.              Past Medical History:   HTN         Past Surgical History:   Surgery history reviewed with patient and noncontributory.          Social History:     Social History     Social History     Marital status:      Spouse name: N/A     Number of children: N/A     Years of education: N/A     Occupational History     Not on file.     Social History Main Topics     Smoking status: Not on file     Smokeless tobacco: Not on file     Alcohol use Not on file     Drug use: Not on file     Sexual activity: Not on file     Other Topics Concern     Not on file     Social History Narrative     No narrative on file           Family History:   Family history reviewed with patient and includes mom with TIA.          Allergies:      Allergies   Allergen Reactions     Indocin [Indomethacin] GI Disturbance          Medications:     Prior to Admission medications    Medication Sig Last Dose Taking? " "Auth Provider   LISINOPRIL PO Take 10 mg by mouth daily  Yes Reported, Patient   ATORVASTATIN CALCIUM PO Take 40 mg by mouth daily  Yes Reported, Patient          Review of Systems:   A Comprehensive greater than 10 system review of systems was carried out.  Pertinent positives and negatives are noted above.  Otherwise negative for contributory information.          Physical Exam:   Blood pressure (!) 157/96, pulse 71, temperature 97.5  F (36.4  C), temperature source Oral, resp. rate 16, height 1.702 m (5' 7\"), weight 62.5 kg (137 lb 12.8 oz), SpO2 94 %.    GENERAL: healthy, alert and no distress  SPEECH: Patient's speech is normal.  EYES: Eyes grossly normal to inspection  HENT: ear canals- normal; TMs- normal; Nose- normal; Mouth- no ulcers, no lesions. Oral Mucosa-normal  NECK: no tenderness, no adenopathy, no asymmetry, no masses, no stiffness; thyroid- normal to palpation  RESP: lungs clear to auscultation - no rales, no rhonchi, no wheezes  CV: regular rates and rhythm, normal S1 S2, no S3 or S4, no murmur, click or rub and peripheral pulses strong  ABDOMEN: soft, no tenderness, no  hepatosplenomegaly, no masses, normal bowel sounds  MS: extremities- no gross deformities noted, no peripheral edema  SKIN: no suspicious lesions, no rashes  NEURO: Normal strength and tone, sensory exam grossly normal  GAIT: normal gait  Cranial nerves 2 through 12 grossly intact and No facial droop, no lid lag, normal facial features  PSYCH: Alert and oriented times 3; coherent speech, normal rate and volume, able to articulate logical thoughts, able to abstract reason, no tangential thoughts, no hallucinations or delusions. Affect is normal.         Data:     EKG demonstrates: rate of 78 bpm in NSR with possible atrial enlargement.     Results for orders placed or performed during the hospital encounter of 08/24/17   XR Chest 2 Views    Narrative    CHEST TWO VIEWS  8/24/2017 6:41 PM     COMPARISON: None.    HISTORY: Atypical " chest pain.    FINDINGS: Median sternotomy changes are noted. The cardiac silhouette,  pulmonary vasculature, lungs and pleural spaces are within normal  limits.      Impression    IMPRESSION: Clear lungs.   MR Brain w/o & w Contrast    Narrative    MRI OF THE BRAIN WITHOUT AND WITH CONTRAST 8/24/2017 6:38 PM     COMPARISON: None.    HISTORY:  Diplopia earlier today, now resolved, concern for TIA versus  atypical migraine.     TECHNIQUE: Axial diffusion-weighted with ADC map, axial T2-weighted  with fat saturation, axial T1-weighted, axial turboFLAIR and coronal  T1-weighted images of the brain were acquired without intravenous  contrast.  Following intravenous administration of gadolinium (10 mL  Gadavist), axial T1-weighted images of the brain were acquired.     FINDINGS: There is minimal diffuse cerebral volume loss. There are  multiple tiny scattered focal areas of abnormal T2 signal  hyperintensity in the cerebral white matter bilaterally that are  consistent with sequela of chronic small vessel ischemic disease.    The ventricles and basal cisterns are within normal limits in  configuration given the degree of cerebral volume loss.  There is no  midline shift.  There are no extra-axial fluid collections.  There is  no evidence for stroke or acute intracranial hemorrhage.  There is no  abnormal contrast enhancement in the brain or its coverings.    There is no sinusitis or mastoiditis.      Impression    IMPRESSION: Diffuse cerebral volume loss and cerebral white matter  changes consistent with chronic small vessel ischemic disease. No  evidence for acute intracranial pathology.    MRA Angiogram Neck w/o & w Contrast    Narrative    MRA NECK WITHOUT AND WITH CONTRAST  8/24/2017 6:38 PM     COMPARISON: None.    HISTORY: Atypical migraine versus TIA, diplopia, now resolved.    TECHNIQUE: 2D time-of-flight MR angiogram of the neck without contrast  and 3D MR angiogram of the neck with 10 mL Gadavist gadolinium  IV  contrast.  MIP reconstruction of all MR angiographic data was  performed. Estimates of carotid stenoses are made relative to the  distal internal carotid artery diameters except as noted.    FINDINGS:    Carotids: The common carotid arteries bilaterally are widely patent.  The cervical internal carotid arteries bilaterally are patent without  stenosis.    Vertebrals: The dominant right and tiny left vertebral arteries are  patent without stenosis and demonstrate antegrade flow.    Aortic arch: The arteries as they arise from the aortic arch are  normally arranged with no evidence for stenosis.      Impression    IMPRESSION:    Normal MR angiogram of the neck.   CBC with platelets differential   Result Value Ref Range    WBC 9.8 4.0 - 11.0 10e9/L    RBC Count 5.10 3.8 - 5.2 10e12/L    Hemoglobin 15.4 11.7 - 15.7 g/dL    Hematocrit 46.2 35.0 - 47.0 %    MCV 91 78 - 100 fl    MCH 30.2 26.5 - 33.0 pg    MCHC 33.3 31.5 - 36.5 g/dL    RDW 13.5 10.0 - 15.0 %    Platelet Count 599 (H) 150 - 450 10e9/L    Diff Method Automated Method     % Neutrophils 62.7 %    % Lymphocytes 25.7 %    % Monocytes 6.7 %    % Eosinophils 4.0 %    % Basophils 0.6 %    % Immature Granulocytes 0.3 %    Nucleated RBCs 0 0 /100    Absolute Neutrophil 6.2 1.6 - 8.3 10e9/L    Absolute Lymphocytes 2.5 0.8 - 5.3 10e9/L    Absolute Monocytes 0.7 0.0 - 1.3 10e9/L    Absolute Eosinophils 0.4 0.0 - 0.7 10e9/L    Absolute Basophils 0.1 0.0 - 0.2 10e9/L    Abs Immature Granulocytes 0.0 0 - 0.4 10e9/L    Absolute Nucleated RBC 0.0    Basic metabolic panel   Result Value Ref Range    Sodium 142 133 - 144 mmol/L    Potassium 3.4 3.4 - 5.3 mmol/L    Chloride 104 94 - 109 mmol/L    Carbon Dioxide 31 20 - 32 mmol/L    Anion Gap 7 3 - 14 mmol/L    Glucose 86 70 - 99 mg/dL    Urea Nitrogen 11 7 - 30 mg/dL    Creatinine 0.84 0.52 - 1.04 mg/dL    GFR Estimate 66 >60 mL/min/1.7m2    GFR Estimate If Black 80 >60 mL/min/1.7m2    Calcium 9.2 8.5 - 10.1 mg/dL    Troponin I   Result Value Ref Range    Troponin I ES 0.061 (H) 0.000 - 0.045 ug/L   EKG 12 lead   Result Value Ref Range    Interpretation ECG Click View Image link to view waveform and result        Vickie Wade PA-C

## 2017-08-25 NOTE — PLAN OF CARE
Problem: Discharge Planning  Goal: Discharge Planning (Adult, OB, Behavioral, Peds)  Outcome: No Change  PRIMARY DIAGNOSIS: SYNCOPE/TIA  OUTPATIENT/OBSERVATION GOALS TO BE MET BEFORE DISCHARGE:  1. Orthostatic performed: No     2. Diagnostic testing complete & at baseline neurologic testing: No-ECHO bubble tmw     3. Cleared by consultants (if involved): N/A     4. Interpretation of cardiac rhythm per telemetry tech: SB, rate 50's-60s     5. Tolerating adequate PO diet and medications: Yes     6. Return to near baseline physical activity or neurologic status: Yes     Discharge Planner Nurse   Safe discharge environment identified: Yes  Barriers to discharge: No       Entered by: Simeon Thao 08/24/2017 10:29 PM   A & 0 x 4.  Slightly hypertensive (157/96) but VSS otherwise.  Per tele tech-SB with rate 50's-60's. Neuro intact, denies visual disturbance, dizziness,  dyspnea.  Rating headache at 1-2/10, declining interventions at this time.  Plan for serial trops (first was 0.061) and ECHO bubble tomorrow.  Continue to monitor and provide supportive cares.      Please review provider order for any additional goals.   Nurse to notify provider when observation goals have been met and patient is ready for discharge.

## 2017-08-25 NOTE — PLAN OF CARE
Problem: Discharge Planning  Goal: Discharge Planning (Adult, OB, Behavioral, Peds)  Problem: Discharge Planning  Goal: Discharge Planning (Adult, OB, Behavioral, Peds)  PRIMARY DIAGNOSIS: DIPLOPIA/TIA R/O  OUTPATIENT/OBSERVATION GOALS TO BE MET BEFORE DISCHARGE:  1. Orthostatic performed: N/A      2. Diagnostic testing complete & at baseline neurologic testing: No, echo with bubble in AM      3. Cleared by consultants (if involved): N/A      4. Interpretation of cardiac rhythm per telemetry tech:       5. Tolerating adequate PO diet and medications: Yes      6. Return to near baseline physical activity or neurologic status: Yes      Pt A&Ox4, vss. Pt reports vision is WNL, no double vision. Pt continues to c/o 3-4/10 headache with movement, declines need for interventions offered - cool wash cloth, additional pain medication. Pt reports pain subsides when she rests her head down on the pillow. Pt up with SBA for safety, appears steady when oob. Trop 0.061, 0.067, 3rd to be drawn at 0600. Will continue to monitor.      Discharge Planner Nurse   Safe discharge environment identified: Yes  Barriers to discharge: Yes - echo       Entered by: Bina Alberto 08/25/2017 12:11 AM     Please review provider order for any additional goals.   Nurse to notify provider when observation goals have been met and patient is ready for discharge.

## 2017-08-25 NOTE — DISCHARGE SUMMARY
[facility] Outpatient / Observation Unit  Discharge Summary        Ally Hernandez MRN# [id number]   YOB: 1945 Age: 71 year old     Date of Admission:  8/24/2017  Date of Discharge:  8/25/2017  1:11 PM  Admitting Physician:  Michelle Truner, DO  Discharge Physician: Lashae Conrad PA-C  Discharging Service: Hospitalist      Primary Provider: Loida Cox  Primary Care Physician Phone Number: [phone]         Primary Discharge Diagnoses:    Ally Hernandez was admitted on 8/24/2017 with complaints of 5-minutes of diplopia concerning for atypical migraine vs post-concussive syndrome vs TIA vs episode of hypertensive urgency.     1. Transient episode of diplopia and increased headache: Unclear etiology. Patient reports approximately 5 minutes of blurred or double-vision that self-resolved. At that time she also had a worsening of the ongoing headache she has had since she sustained a concussion 6 weeks ago. Otherwise no other focal neurological deficits noted. There were no other witnesses there when she was symptomatic. She thinks she may have had one other episode in the past 6 weeks where there was some transient acute change in vision in one of her eyes, but resolved. Symptoms all resolved. MRI of brain and MRA of head and neck negative for acute pathology. Resting echo shows EF of 55-60% with no wall motion abnormalities and negative bubble study. Telemetry overnight showed normal sinus rhythm. Differential includes post-concussive syndrome, TIA, atypical migraine, hypertensive urgency. VSS on discharge, no further antihypertensive medications were started.  -Patient used to be on asa 81 mg but d/t hx of GI ulcer stopped taking it.  -For now, will restart asa 81 mg daily and start BID zantac OTC for GI protection.  -Continue PTA lisinopril and atorvastatin.  -Tylenol and caffeine for headache.  -Will discharge on 14-day Zio Patch.  -Continue concussion protocol.  -She will follow up with  her PCP within the week and discuss the above listed medication changes with them at that time.    2. Mild troponin elevation: ECG showed NSR w/ no ischemic findings. No prior troponin values to compare. No hx of CAD. No hx of CKD. She denied any chest pain, palpitations, or sob during the entire course of her symptoms, and has had none of those symptoms during her entire hospital stay. Troponin checked in ER mildly elevated at 0.061. Trend overnight was 0.067, 0.070, and then 0.066. Telemetry overnight NSR. In Care Everywhere it appears she wore a Zio Patch around New Years that showed a brief episode of non-sustained V tach as well as 17 runs of SVT, and she does not recall of she was symptomatic with those episodes. Resting echocardiogram here shows no wall motion abnormalities. Low clinical suspicion for ACS. Suspect mild troponin leak from urgent hypertension yesterday vs demand ischemia from paroxysmal tachyarrhythmia prior to admission.   -Zio Patch on discharge  -If any chest pain or sob, told to seek medical evaluation immediately.          Secondary Discharge Diagnoses:   HTN  HLD  Pericarditis s/p pericardiectomy  H/o migraines           Code Status:      Full Code        Brief Hospital Summary:        Reason for your hospital stay       You were evaluated in the hospital for a several-minute episode of   diplopia (double vision) and increased headache. Your vision changes   resolved before you got to the ER. You were evaluated extensively in the   ER and had a normal MRI of the brain and neck vessels. A lab called   troponin was checked and found to be mildly elevated. Your heart rhythm   was monitored overnight, and we checked additional troponin values   throughout the night to trend them. Your troponin values were rather   stable, slowly trended up a little and then back down. We discussed that   elevated troponin lab can be from multiple causes (anything that would put   strain on the heart like  persistently high blood pressure, rapid heart   rhythm, or in some cases a heart attack). You heart rhythm was a normal   rate and regular rhythm all night. You had no chest pain or shortness of   breath. You did have very high blood pressure when you came to the   emergency department (now resolved) and on a previous cardiac monitor you   had several small episodes of rapid heart rhythms), so that may be why the   troponin values were mildly elevated. An echocardiogram was performed that   was normal with no evidence of patent foramen ovale (opening between the   top two chambers of the heart). It is unclear what caused your symptoms.   It may be related to post-concussive syndrome, it may have been a   transient ischemic attack (stroke-like symptoms that resolve in less than   24 hours), or it may have been related to an atypical migraine headache.   For now, we will have you restart on aspirin 81 mg daily (along with   zantac 150 mg twice a day to protect your stomach lining).  We will have   you wear a zio patch for the next two weeks. Continue concussion protocol   as instructed by your primary providers. We will have you follow up with   your primary care provider within the next week.                    Please refer to initial admission history and physical for further details.   Briefly, Ally Hernandez was admitted on 8/24/2017 for complaints of                                     Diplopia and headache concerning for TIA vs atypical migraine.    Initial work up in the ED revealed a negative MRI/MRA of head and neck for any acute process.   EKG did not reveal evidence of significant cardiac arrhythmias or ischemia.  Pt was registered to the Observation Unit for further evaluation.     Pt was placed on telemetry and underwent frequent neuro checks.   Pt was anticoagulated with Aspirin EC 81 mg po daily.  Laboratory results were reviewed and significant findings addressed. Patient had mild troponin elevation  when checked in ER (in setting of no chest pain, sob, or concerning ECG abnormalities)  ECHO with bubble was performed as well as MRI/MRA of the brain and neck (with results listed below) No evidence of acute CVA was found. Neurology consult was not obtained.  On the day of discharge, patient had resolution of the symptoms, telemetry did not reveal any significant arrhythmias, vitals remained stable and pt was deemed safe for discharge. Medications were reviewed and adjustments made as necessary. Pt is instructed to follow up as below.           Significant Labs & Imaging During Hospitalization:        Results for orders placed or performed during the hospital encounter of 08/24/17 (from the past 48 hour(s))   EKG 12 lead   Result Value Ref Range    Interpretation ECG Click View Image link to view waveform and result    CBC with platelets differential   Result Value Ref Range    WBC 9.8 4.0 - 11.0 10e9/L    RBC Count 5.10 3.8 - 5.2 10e12/L    Hemoglobin 15.4 11.7 - 15.7 g/dL    Hematocrit 46.2 35.0 - 47.0 %    MCV 91 78 - 100 fl    MCH 30.2 26.5 - 33.0 pg    MCHC 33.3 31.5 - 36.5 g/dL    RDW 13.5 10.0 - 15.0 %    Platelet Count 599 (H) 150 - 450 10e9/L    Diff Method Automated Method     % Neutrophils 62.7 %    % Lymphocytes 25.7 %    % Monocytes 6.7 %    % Eosinophils 4.0 %    % Basophils 0.6 %    % Immature Granulocytes 0.3 %    Nucleated RBCs 0 0 /100    Absolute Neutrophil 6.2 1.6 - 8.3 10e9/L    Absolute Lymphocytes 2.5 0.8 - 5.3 10e9/L    Absolute Monocytes 0.7 0.0 - 1.3 10e9/L    Absolute Eosinophils 0.4 0.0 - 0.7 10e9/L    Absolute Basophils 0.1 0.0 - 0.2 10e9/L    Abs Immature Granulocytes 0.0 0 - 0.4 10e9/L    Absolute Nucleated RBC 0.0    Basic metabolic panel   Result Value Ref Range    Sodium 142 133 - 144 mmol/L    Potassium 3.4 3.4 - 5.3 mmol/L    Chloride 104 94 - 109 mmol/L    Carbon Dioxide 31 20 - 32 mmol/L    Anion Gap 7 3 - 14 mmol/L    Glucose 86 70 - 99 mg/dL    Urea Nitrogen 11 7 - 30 mg/dL     Creatinine 0.84 0.52 - 1.04 mg/dL    GFR Estimate 66 >60 mL/min/1.7m2    GFR Estimate If Black 80 >60 mL/min/1.7m2    Calcium 9.2 8.5 - 10.1 mg/dL   Troponin I   Result Value Ref Range    Troponin I ES 0.061 (H) 0.000 - 0.045 ug/L   MR Brain w/o & w Contrast    Narrative    MRI OF THE BRAIN WITHOUT AND WITH CONTRAST 8/24/2017 6:38 PM     COMPARISON: None.    HISTORY:  Diplopia earlier today, now resolved, concern for TIA versus  atypical migraine.     TECHNIQUE: Axial diffusion-weighted with ADC map, axial T2-weighted  with fat saturation, axial T1-weighted, axial turboFLAIR and coronal  T1-weighted images of the brain were acquired without intravenous  contrast.  Following intravenous administration of gadolinium (10 mL  Gadavist), axial T1-weighted images of the brain were acquired.     FINDINGS: There is minimal diffuse cerebral volume loss. There are  multiple tiny scattered focal areas of abnormal T2 signal  hyperintensity in the cerebral white matter bilaterally that are  consistent with sequela of chronic small vessel ischemic disease.    The ventricles and basal cisterns are within normal limits in  configuration given the degree of cerebral volume loss.  There is no  midline shift.  There are no extra-axial fluid collections.  There is  no evidence for stroke or acute intracranial hemorrhage.  There is no  abnormal contrast enhancement in the brain or its coverings.    There is no sinusitis or mastoiditis.      Impression    IMPRESSION: Diffuse cerebral volume loss and cerebral white matter  changes consistent with chronic small vessel ischemic disease. No  evidence for acute intracranial pathology.     KARIME KELLY MD   MRA Angiogram Neck w/o & w Contrast    Narrative    MRA NECK WITHOUT AND WITH CONTRAST  8/24/2017 6:38 PM     COMPARISON: None.    HISTORY: Atypical migraine versus TIA, diplopia, now resolved.    TECHNIQUE: 2D time-of-flight MR angiogram of the neck without contrast  and 3D MR  angiogram of the neck with 10 mL Gadavist gadolinium IV  contrast.  MIP reconstruction of all MR angiographic data was  performed. Estimates of carotid stenoses are made relative to the  distal internal carotid artery diameters except as noted.    FINDINGS:    Carotids: The common carotid arteries bilaterally are widely patent.  The cervical internal carotid arteries bilaterally are patent without  stenosis.    Vertebrals: The dominant right and tiny left vertebral arteries are  patent without stenosis and demonstrate antegrade flow.    Aortic arch: The arteries as they arise from the aortic arch are  normally arranged with no evidence for stenosis.      Impression    IMPRESSION:    Normal MR angiogram of the neck.    KARIME KELLY MD   XR Chest 2 Views    Narrative    CHEST TWO VIEWS  2017 6:41 PM     COMPARISON: None.    HISTORY: Atypical chest pain.    FINDINGS: Median sternotomy changes are noted. The cardiac silhouette,  pulmonary vasculature, lungs and pleural spaces are within normal  limits.      Impression    IMPRESSION: Clear lungs.    KARIME KELLY MD   Zio Patch Holter    Narrative    [facility] OBSERVATION DEPARTMENT  [address] [place] [address]  [phone]  2017      Patient:  Ally MCKINNEY Short  Chart: [id number]  :  1945  Age:  71 year old  Sex:  female       Procedure:  ZioPatch Monitor.        Technician performing hook-up:  Jareth Moncada     Troponin I   Result Value Ref Range    Troponin I ES 0.067 (H) 0.000 - 0.045 ug/L   Troponin I   Result Value Ref Range    Troponin I ES 0.070 (H) 0.000 - 0.045 ug/L   Basic metabolic panel   Result Value Ref Range    Sodium 142 133 - 144 mmol/L    Potassium 3.7 3.4 - 5.3 mmol/L    Chloride 110 (H) 94 - 109 mmol/L    Carbon Dioxide 24 20 - 32 mmol/L    Anion Gap 8 3 - 14 mmol/L    Glucose 83 70 - 99 mg/dL    Urea Nitrogen 12 7 - 30 mg/dL    Creatinine 0.68 0.52 - 1.04 mg/dL    GFR Estimate 84 >60  mL/min/1.7m2    GFR Estimate If Black >90 >60 mL/min/1.7m2    Calcium 8.4 (L) 8.5 - 10.1 mg/dL   CBC with platelets differential   Result Value Ref Range    WBC 6.5 4.0 - 11.0 10e9/L    RBC Count 4.48 3.8 - 5.2 10e12/L    Hemoglobin 13.7 11.7 - 15.7 g/dL    Hematocrit 40.6 35.0 - 47.0 %    MCV 91 78 - 100 fl    MCH 30.6 26.5 - 33.0 pg    MCHC 33.7 31.5 - 36.5 g/dL    RDW 13.6 10.0 - 15.0 %    Platelet Count 434 150 - 450 10e9/L    Diff Method Automated Method     % Neutrophils 51.8 %    % Lymphocytes 31.5 %    % Monocytes 8.4 %    % Eosinophils 6.8 %    % Basophils 1.2 %    % Immature Granulocytes 0.3 %    Nucleated RBCs 0 0 /100    Absolute Neutrophil 3.3 1.6 - 8.3 10e9/L    Absolute Lymphocytes 2.0 0.8 - 5.3 10e9/L    Absolute Monocytes 0.5 0.0 - 1.3 10e9/L    Absolute Eosinophils 0.4 0.0 - 0.7 10e9/L    Absolute Basophils 0.1 0.0 - 0.2 10e9/L    Abs Immature Granulocytes 0.0 0 - 0.4 10e9/L    Absolute Nucleated RBC 0.0    Lipid panel reflex to direct LDL   Result Value Ref Range    Cholesterol 129 <200 mg/dL    Triglycerides 135 <150 mg/dL    HDL Cholesterol 48 (L) >49 mg/dL    LDL Cholesterol Calculated 54 <100 mg/dL    Non HDL Cholesterol 81 <130 mg/dL   Echo Complete Bubble    Narrative    [id number]  [facility]  [id number]  [id number]^MORENO^GEOVANNA^[facility]  Echocardiography Laboratory  [address] [place] [address]        Name: SYBIL PARTIDA  MRN: [id number]  : 1945  Study Date: 2017 08:10 AM  Age: 71 yrs  Gender: Female  Patient Location: [facility]  Reason For Study: TIA  Ordering Physician: GEOVANNA MAYER  Referring Physician: Loida Cox  Performed By: Nestor Stinson RDCS     BSA: 1.7 m2  Height: 67 in  Weight: 137 lb  HR: 62  BP: 157/96 mmHg  _____________________________________________________________________________  __        Procedure  Complete Portable Bubble Echo  Adult.  _____________________________________________________________________________  __        Interpretation Summary     The left ventricle is normal in size. There is normal left ventricular wall  thickness. Left ventricular systolic function is normal. The visual ejection  fraction is estimated at 55-60%. Grade I or early diastolic dysfunction. No  regional wall motion abnormalities noted.  The right ventricle is normal size. The right ventricular systolic function is  normal.  Normal left atrial size. Right atrial size is normal. There is no color  Doppler evidence of an atrial shunt. A contrast injection (Bubble Study) was  performed that was negative for flow across the interatrial septum.  Mild mitral, tricuspid and aortic regurgitation.  Mild aortic root dilatation.  No pericardial effusion.  No previous study for comparison.  _____________________________________________________________________________  __        Left Ventricle  The left ventricle is normal in size. There is normal left ventricular wall  thickness. Left ventricular systolic function is normal. The visual ejection  fraction is estimated at 55-60%. Grade I or early diastolic dysfunction. No  regional wall motion abnormalities noted.     Right Ventricle  The right ventricle is normal size. The right ventricular systolic function is  normal.     Atria  Normal left atrial size. Right atrial size is normal. There is no color  Doppler evidence of an atrial shunt. A contrast injection (Bubble Study) was  performed that was negative for flow across the interatrial septum.     Mitral Valve  The mitral valve leaflets are mildly thickened. There is mild (1+) mitral  regurgitation.        Tricuspid Valve  There is mild (1+) tricuspid regurgitation. The right ventricular systolic  pressure is approximated at 21.2 mmHg plus the right atrial pressure.     Aortic Valve  There is mild trileaflet aortic sclerosis. There is mild (1+) aortic  regurgitation.      Pulmonic Valve  There is trace to mild pulmonic valvular regurgitation. There is no pulmonic  valvular stenosis.     Vessels  Mild aortic root dilatation. Normal size ascending aorta. Descending aortic  velocity normal. The IVC is normal in size and reactivity with respiration,  suggesting normal central venous pressure.     Pericardium  There is no pericardial effusion.        Rhythm  The rhythm was normal sinus.  _____________________________________________________________________________  __  MMode/2D Measurements & Calculations  IVSd: 1.00 cm     LVIDd: 4.3 cm  LVIDs: 3.1 cm  LVPWd: 1.0 cm  FS: 27.8 %  EDV(Teich): 82.2 ml  ESV(Teich): 37.6 ml  LV mass(C)d: 144.1 grams  LV mass(C)dI: 83.7 grams/m2  Ao root diam: 3.8 cm  LA dimension: 4.1 cm  asc Aorta Diam: 3.2 cm  LA/Ao: 1.1  LVOT diam: 2.2 cm  LVOT area: 3.8 cm2  LA Volume (BP): 48.0 ml  LA Volume Index (BP): 27.9 ml/m2           Doppler Measurements & Calculations  MV E max rigo: 94.8 cm/sec  MV A max rigo: 130.0 cm/sec  MV E/A: 0.73  MV dec time: 0.21 sec  AI P1/2t: 565.7 msec  LV V1 max P.3 mmHg  LV V1 max: 76.4 cm/sec  LV V1 VTI: 18.1 cm  CO(LVOT): 4.2 l/min  CI(LVOT): 2.4 l/min/m2  SV(LVOT): 68.8 ml  SI(LVOT): 40.0 ml/m2  TR max rigo: 230.0 cm/sec  TR max P.2 mmHg  Lateral E/e': 12.3  Medial E/e': 20.7           _____________________________________________________________________________  __           Report approved by: Cristiana Laguerre 2017 09:42 AM      Troponin I   Result Value Ref Range    Troponin I ES 0.066 (H) 0.000 - 0.045 ug/L           Pending Results:        Unresulted Labs Ordered in the Past 30 Days of this Admission     No orders found for last 61 day(s).              Consultations This Hospital Stay:      No consultations were requested during this admission         Discharge Instructions and Follow-Up:      Follow-up Appointments     Follow-up and recommended labs and tests        Follow up with primary care provider, Loida  "Monserrat Cox, within 7 days   for hospital follow- up.  No follow up labs or test are needed at this   time, follow up with Loida Cox regarding your Zio Patch results.                  Pt instructed to follow up with PCP or Neurologist within 2-3 days of discharge.    Follow-up Labs None.        Discharge Disposition:      Discharged to home         Discharge Medications:        Discharge Medication List as of 8/25/2017 12:38 PM      START taking these medications    Details   aspirin EC 81 MG EC tablet Take 1 tablet (81 mg) by mouth daily, No Print Out      ranitidine (ZANTAC) 75 MG tablet Take 1 tablet (75 mg) by mouth 2 times daily, Disp-30 tablet, R-0, No Print Out         CONTINUE these medications which have NOT CHANGED    Details   LISINOPRIL PO Take 10 mg by mouth daily, Historical      ATORVASTATIN CALCIUM PO Take 40 mg by mouth At Bedtime , Historical                 Allergies:         Allergies   Allergen Reactions     Indocin [Indomethacin] GI Disturbance           Condition and Physical on Discharge:      Discharge condition: Stable   Vitals: Blood pressure 150/81, pulse 60, temperature 97.9  F (36.6  C), temperature source Oral, resp. rate 20, height 1.702 m (5' 7\"), weight 62.5 kg (137 lb 12.8 oz), SpO2 98 %.  137 lbs 12.8 oz      GENERAL:  Comfortable.  PSYCH: pleasant, oriented, No acute distress.  HEENT:  PERRLA. Normal conjunctiva, normal hearing, nasal mucosa and Oropharynx are normal.  NECK:  Supple, no neck vein distention, adenopathy or bruits, normal thyroid.  HEART:  Normal S1, S2 with no murmur, no pericardial rub, gallops or S3 or S4.  LUNGS:  Clear to auscultation, normal Respiratory effort. No wheezing, rales or ronchi.  ABDOMEN:  Soft, no hepatosplenomegaly, normal bowel sounds. Non-tender, non distended.   EXTREMITIES:  No pedal edema, +2 pulses bilateral and equal.  SKIN:  Dry to touch, No rash, wound or ulcerations.  NEUROLOGIC:  CN 2-12 intact, BL 5/5 symmetric upper and lower " extremity strength, sensation is intact with no focal deficits.